# Patient Record
Sex: MALE | Race: WHITE | NOT HISPANIC OR LATINO | Employment: OTHER | URBAN - METROPOLITAN AREA
[De-identification: names, ages, dates, MRNs, and addresses within clinical notes are randomized per-mention and may not be internally consistent; named-entity substitution may affect disease eponyms.]

---

## 2017-12-15 ENCOUNTER — TRANSCRIBE ORDERS (OUTPATIENT)
Dept: ADMINISTRATIVE | Facility: HOSPITAL | Age: 82
End: 2017-12-15

## 2017-12-15 DIAGNOSIS — I73.9 PVD (PERIPHERAL VASCULAR DISEASE) (HCC): Primary | ICD-10-CM

## 2018-01-05 ENCOUNTER — HOSPITAL ENCOUNTER (OUTPATIENT)
Dept: CT IMAGING | Facility: HOSPITAL | Age: 83
Discharge: HOME | End: 2018-01-05
Attending: SURGERY | Admitting: SURGERY

## 2018-01-05 DIAGNOSIS — I73.9 PVD (PERIPHERAL VASCULAR DISEASE) (HCC): ICD-10-CM

## 2018-01-05 LAB — CREAT BLDA-MCNC: 1.3 MG/DL (ref 0.6–1.3)

## 2018-01-05 PROCEDURE — 82565 ASSAY OF CREATININE: CPT

## 2018-01-05 PROCEDURE — 0 IOPAMIDOL 61 % SOLUTION: Performed by: SURGERY

## 2018-01-05 PROCEDURE — 75635 CT ANGIO ABDOMINAL ARTERIES: CPT

## 2018-01-05 RX ADMIN — IOPAMIDOL 85 ML: 612 INJECTION, SOLUTION INTRAVENOUS at 12:15

## 2018-03-02 ENCOUNTER — APPOINTMENT (OUTPATIENT)
Dept: PREADMISSION TESTING | Facility: HOSPITAL | Age: 83
End: 2018-03-02

## 2019-03-28 ENCOUNTER — TRANSCRIBE ORDERS (OUTPATIENT)
Dept: CARDIOLOGY | Facility: HOSPITAL | Age: 84
End: 2019-03-28

## 2019-03-28 DIAGNOSIS — I71.40 AAA (ABDOMINAL AORTIC ANEURYSM) WITHOUT RUPTURE (HCC): Primary | ICD-10-CM

## 2019-05-16 ENCOUNTER — ANESTHESIA (OUTPATIENT)
Dept: GASTROENTEROLOGY | Facility: HOSPITAL | Age: 84
End: 2019-05-16

## 2019-05-16 ENCOUNTER — HOSPITAL ENCOUNTER (OUTPATIENT)
Facility: HOSPITAL | Age: 84
Setting detail: HOSPITAL OUTPATIENT SURGERY
Discharge: HOME | End: 2019-05-16
Attending: INTERNAL MEDICINE | Admitting: INTERNAL MEDICINE

## 2019-05-16 ENCOUNTER — ANESTHESIA EVENT (OUTPATIENT)
Dept: GASTROENTEROLOGY | Facility: HOSPITAL | Age: 84
End: 2019-05-16

## 2019-05-16 VITALS
RESPIRATION RATE: 16 BRPM | DIASTOLIC BLOOD PRESSURE: 64 MMHG | BODY MASS INDEX: 30.78 KG/M2 | HEART RATE: 71 BPM | SYSTOLIC BLOOD PRESSURE: 104 MMHG | HEIGHT: 70 IN | TEMPERATURE: 97.6 F | WEIGHT: 215 LBS | OXYGEN SATURATION: 94 %

## 2019-05-16 DIAGNOSIS — R93.89 ABNORMAL CXR: ICD-10-CM

## 2019-05-16 DIAGNOSIS — R05.9 COUGH: ICD-10-CM

## 2019-05-16 LAB
GIE STN SPEC: NORMAL
GLUCOSE BLDC GLUCOMTR-MCNC: 129 MG/DL (ref 70–130)

## 2019-05-16 PROCEDURE — 88112 CYTOPATH CELL ENHANCE TECH: CPT | Performed by: INTERNAL MEDICINE

## 2019-05-16 PROCEDURE — 25010000002 PROPOFOL 10 MG/ML EMULSION: Performed by: ANESTHESIOLOGY

## 2019-05-16 PROCEDURE — 87206 SMEAR FLUORESCENT/ACID STAI: CPT | Performed by: INTERNAL MEDICINE

## 2019-05-16 PROCEDURE — 87071 CULTURE AEROBIC QUANT OTHER: CPT | Performed by: INTERNAL MEDICINE

## 2019-05-16 PROCEDURE — 87102 FUNGUS ISOLATION CULTURE: CPT | Performed by: INTERNAL MEDICINE

## 2019-05-16 PROCEDURE — 87205 SMEAR GRAM STAIN: CPT | Performed by: INTERNAL MEDICINE

## 2019-05-16 PROCEDURE — 88305 TISSUE EXAM BY PATHOLOGIST: CPT | Performed by: INTERNAL MEDICINE

## 2019-05-16 PROCEDURE — 87116 MYCOBACTERIA CULTURE: CPT | Performed by: INTERNAL MEDICINE

## 2019-05-16 PROCEDURE — 82962 GLUCOSE BLOOD TEST: CPT

## 2019-05-16 RX ORDER — LIDOCAINE HYDROCHLORIDE 10 MG/ML
INJECTION, SOLUTION EPIDURAL; INFILTRATION; INTRACAUDAL; PERINEURAL AS NEEDED
Status: DISCONTINUED | OUTPATIENT
Start: 2019-05-16 | End: 2019-05-16 | Stop reason: HOSPADM

## 2019-05-16 RX ORDER — PROPOFOL 10 MG/ML
VIAL (ML) INTRAVENOUS AS NEEDED
Status: DISCONTINUED | OUTPATIENT
Start: 2019-05-16 | End: 2019-05-16 | Stop reason: SURG

## 2019-05-16 RX ORDER — LORAZEPAM 2 MG/1
2 TABLET ORAL 3 TIMES DAILY PRN
COMMUNITY

## 2019-05-16 RX ORDER — PANTOPRAZOLE SODIUM 40 MG/1
40 TABLET, DELAYED RELEASE ORAL DAILY
COMMUNITY

## 2019-05-16 RX ORDER — ALBUTEROL SULFATE 90 UG/1
2 AEROSOL, METERED RESPIRATORY (INHALATION) EVERY 4 HOURS PRN
COMMUNITY

## 2019-05-16 RX ORDER — HYDROCHLOROTHIAZIDE 25 MG/1
25 TABLET ORAL DAILY
COMMUNITY

## 2019-05-16 RX ORDER — SODIUM CHLORIDE, SODIUM LACTATE, POTASSIUM CHLORIDE, CALCIUM CHLORIDE 600; 310; 30; 20 MG/100ML; MG/100ML; MG/100ML; MG/100ML
30 INJECTION, SOLUTION INTRAVENOUS CONTINUOUS PRN
Status: DISCONTINUED | OUTPATIENT
Start: 2019-05-16 | End: 2019-05-16 | Stop reason: HOSPADM

## 2019-05-16 RX ORDER — LIDOCAINE HYDROCHLORIDE 20 MG/ML
INJECTION, SOLUTION INFILTRATION; PERINEURAL AS NEEDED
Status: DISCONTINUED | OUTPATIENT
Start: 2019-05-16 | End: 2019-05-16 | Stop reason: SURG

## 2019-05-16 RX ORDER — LOSARTAN POTASSIUM 50 MG/1
100 TABLET ORAL DAILY
COMMUNITY

## 2019-05-16 RX ORDER — METOPROLOL TARTRATE 50 MG/1
50 TABLET, FILM COATED ORAL 2 TIMES DAILY
COMMUNITY

## 2019-05-16 RX ORDER — GABAPENTIN 300 MG/1
300 CAPSULE ORAL DAILY
COMMUNITY

## 2019-05-16 RX ORDER — WARFARIN SODIUM 5 MG/1
5 TABLET ORAL
COMMUNITY

## 2019-05-16 RX ORDER — ATENOLOL 50 MG/1
50 TABLET ORAL 2 TIMES DAILY
COMMUNITY

## 2019-05-16 RX ORDER — TAMSULOSIN HYDROCHLORIDE 0.4 MG/1
1 CAPSULE ORAL NIGHTLY
COMMUNITY

## 2019-05-16 RX ORDER — AMLODIPINE BESYLATE 10 MG/1
10 TABLET ORAL DAILY
COMMUNITY

## 2019-05-16 RX ORDER — ATORVASTATIN CALCIUM 10 MG/1
10 TABLET, FILM COATED ORAL DAILY
COMMUNITY

## 2019-05-16 RX ORDER — FUROSEMIDE 40 MG/1
40 TABLET ORAL DAILY
COMMUNITY

## 2019-05-16 RX ADMIN — LIDOCAINE HYDROCHLORIDE 40 MG: 20 INJECTION, SOLUTION INFILTRATION; PERINEURAL at 09:14

## 2019-05-16 RX ADMIN — PROPOFOL 80 MG: 10 INJECTION, EMULSION INTRAVENOUS at 09:14

## 2019-05-16 RX ADMIN — SODIUM CHLORIDE, POTASSIUM CHLORIDE, SODIUM LACTATE AND CALCIUM CHLORIDE 30 ML/HR: 600; 310; 30; 20 INJECTION, SOLUTION INTRAVENOUS at 08:33

## 2019-05-16 NOTE — ANESTHESIA PREPROCEDURE EVALUATION
Anesthesia Evaluation     Patient summary reviewed and Nursing notes reviewed   NPO Solid Status: > 8 hours             Airway   Mallampati: III  TM distance: <3 FB  Possible difficult intubation  Dental    (+) lower dentures and upper dentures    Pulmonary - normal exam   (+) a smoker Former,   Cardiovascular - normal exam    ECG reviewed  PT is on anticoagulation therapy  Beta blocker given within 24 hours of surgery    (+) hypertension 2 medications or greater, CAD, CABG >6 Months, dysrhythmias Atrial Fib, PVD (AAA),       Neuro/Psych  (+) CVA residual symptoms,     GI/Hepatic/Renal/Endo      Musculoskeletal     Abdominal    Substance History      OB/GYN          Other                      Anesthesia Plan    ASA 3     general     Anesthetic plan, all risks, benefits, and alternatives have been provided, discussed and informed consent has been obtained with: patient and child.

## 2019-05-16 NOTE — OP NOTE
Bronchoscopy Procedure Note    Procedure:  1. Bronchoscopy, Diagnostic    Pre-Operative Diagnosis:  Persistent left lower lobe atelectasis    Post-Operative Diagnosis: Same    Indication:  Chronic cough and abnormal CXR with persistent left lower lobe atelectasis     Anesthesia: Monitored Anesthesia Care (MAC)    Procedure Details: Patient was consented for the procedure with all risk and benefit of the procedure explained in detail.  Patient was given the opportunity to ask questions and all concerns were answered.  The bronchocope was inserted into the main airway via the LMA. An anatomical survey was done of the main airways and the subsegmental bronchus to at least the first subsegmental level of all five lobes of both lungs.  The findings are reported below.  A bronchoalveolar lavage was performed using aliquots of normal saline instilled into the airways then aspirated back.    Findings:  Bronchoscope passed through LMA to the level of the vocal cords.  Lidocaine used for local anesthetic over vocal cords.  Bronchoscope was passed between the vocal cords into the trachea.  All airways were visualized to at least the first subsegment level of all 5 lobes of both lungs.  Airways were of normal size and caliber.  No endobronchial lesions seen.    Airways were easily collapsible with inspiration/expiration noted.  No endobronchial lesions noted.      Bronchial alveolar lavage performed in the left lower lobe with 120cc saline instilled and 38cc cellular return.      Estimated Blood Loss:  Minimal           Specimens:  As above                Complications:  None; patient tolerated the procedure well.           Disposition: PACU - hemodynamically stable.      Patient tolerated the procedure well.    Yao Redd MD  5/16/2019  9:21 AM

## 2019-05-16 NOTE — ANESTHESIA POSTPROCEDURE EVALUATION
"Patient: Farhat Quesada    Procedure Summary     Date:  05/16/19 Room / Location:  [facility] ENDOSCOPY 7 /  [facility]    Anesthesia Start:  0906 Anesthesia Stop:  0935    Procedure:  BRONCHOSCOPY with BAL (N/A Bronchus) Diagnosis:      Surgeon:  Yao Redd MD Provider:  Brittny James MD    Anesthesia Type:  general ASA Status:  3          Anesthesia Type: general  Last vitals  BP   159/87 (05/16/19 0817)   Temp   36.4 °C (97.6 °F) (05/16/19 0817)   Pulse   61 (05/16/19 0817)   Resp   16 (05/16/19 0817)     SpO2   100 % (05/16/19 0817)     Post Anesthesia Care and Evaluation    Patient location during evaluation: PACU  Patient participation: complete - patient participated  Level of consciousness: awake  Pain score: 0  Pain management: adequate  Airway patency: patent  Anesthetic complications: No anesthetic complications    Cardiovascular status: acceptable  Respiratory status: acceptable  Hydration status: acceptable    Comments: Blood pressure 159/87, pulse 61, temperature 36.4 °C (97.6 °F), temperature source Oral, resp. rate 16, height 177.8 cm (70\"), weight 97.5 kg (215 lb), SpO2 100 %.    No anesthesia care post op    "

## 2019-05-16 NOTE — DISCHARGE INSTRUCTIONS

## 2019-05-17 LAB
CYTO UR: NORMAL
LAB AP CASE REPORT: NORMAL
PATH REPORT.FINAL DX SPEC: NORMAL
PATH REPORT.GROSS SPEC: NORMAL

## 2019-05-18 LAB
BACTERIA SPEC AEROBE CULT: NORMAL
GRAM STN SPEC: NORMAL

## 2019-05-29 ENCOUNTER — APPOINTMENT (OUTPATIENT)
Dept: CT IMAGING | Facility: HOSPITAL | Age: 84
End: 2019-05-29

## 2019-06-17 LAB — FUNGUS WND CULT: ABNORMAL

## 2019-06-27 LAB
MYCOBACTERIUM SPEC CULT: NORMAL
NIGHT BLUE STAIN TISS: NORMAL

## 2019-08-22 ENCOUNTER — HOSPITAL ENCOUNTER (OUTPATIENT)
Dept: CT IMAGING | Facility: HOSPITAL | Age: 84
Discharge: HOME | End: 2019-08-22
Admitting: SURGERY

## 2019-08-22 DIAGNOSIS — I71.40 AAA (ABDOMINAL AORTIC ANEURYSM) WITHOUT RUPTURE (HCC): ICD-10-CM

## 2019-08-22 LAB — CREAT BLDA-MCNC: 1.2 MG/DL (ref 0.6–1.3)

## 2019-08-22 PROCEDURE — 0 IOPAMIDOL PER 1 ML: Performed by: SURGERY

## 2019-08-22 PROCEDURE — 72191 CT ANGIOGRAPH PELV W/O&W/DYE: CPT

## 2019-08-22 PROCEDURE — 71275 CT ANGIOGRAPHY CHEST: CPT

## 2019-08-22 PROCEDURE — 82565 ASSAY OF CREATININE: CPT

## 2019-08-22 PROCEDURE — 74174 CTA ABD&PLVS W/CONTRAST: CPT

## 2019-08-22 RX ADMIN — IOPAMIDOL 95 ML: 755 INJECTION, SOLUTION INTRAVENOUS at 10:56
